# Patient Record
Sex: FEMALE | Race: WHITE | NOT HISPANIC OR LATINO | Employment: OTHER | URBAN - METROPOLITAN AREA
[De-identification: names, ages, dates, MRNs, and addresses within clinical notes are randomized per-mention and may not be internally consistent; named-entity substitution may affect disease eponyms.]

---

## 2022-04-14 ENCOUNTER — NEW PATIENT (OUTPATIENT)
Dept: URBAN - METROPOLITAN AREA CLINIC 30 | Facility: CLINIC | Age: 67
End: 2022-04-14

## 2022-04-14 DIAGNOSIS — H25.13: ICD-10-CM

## 2022-04-14 DIAGNOSIS — H35.09: ICD-10-CM

## 2022-04-14 DIAGNOSIS — H53.8: ICD-10-CM

## 2022-04-14 DIAGNOSIS — Z46.0: ICD-10-CM

## 2022-04-14 PROCEDURE — 92015 DETERMINE REFRACTIVE STATE: CPT

## 2022-04-14 PROCEDURE — 92499RSE RETINAL SCREENING ELECTIVE

## 2022-04-14 PROCEDURE — 92310-1N NEW CL PATIENT SPHERICAL SINGLE VISION SOFT LENS EVALUATION

## 2022-04-14 PROCEDURE — 99204 OFFICE O/P NEW MOD 45 MIN: CPT

## 2022-04-14 ASSESSMENT — VISUAL ACUITY
OD_SC: 20/100-2
OD_PH: 20/30-1
OS_SC: 20/25-2
OD_SC: 20/200
OS_SC: 20/40-1

## 2022-04-14 ASSESSMENT — KERATOMETRY
OD_AXISANGLE_DEGREES: 161
OD_K2POWER_DIOPTERS: 45.50
OS_AXISANGLE_DEGREES: 21
OS_AXISANGLE2_DEGREES: 111
OS_K2POWER_DIOPTERS: 42.75
OS_K1POWER_DIOPTERS: 42.00
OD_AXISANGLE2_DEGREES: 71
OD_K1POWER_DIOPTERS: 44.25

## 2022-04-14 ASSESSMENT — TONOMETRY
OD_IOP_MMHG: 11
OS_IOP_MMHG: 12

## 2025-03-25 NOTE — PATIENT DISCUSSION
Glasses Prescription given to patient.
Patient understands condition, prognosis and need for follow up care.
Recommended observation.
Trial lenses dispensed. PT LEAVES IN TRIAL 1.
Opt out